# Patient Record
Sex: FEMALE | Race: WHITE | Employment: FULL TIME | URBAN - METROPOLITAN AREA
[De-identification: names, ages, dates, MRNs, and addresses within clinical notes are randomized per-mention and may not be internally consistent; named-entity substitution may affect disease eponyms.]

---

## 2017-02-03 DIAGNOSIS — G47.00 INSOMNIA, UNSPECIFIED TYPE: ICD-10-CM

## 2017-02-03 RX ORDER — ZOLPIDEM TARTRATE 5 MG/1
5 TABLET ORAL
Qty: 30 TAB | Refills: 0 | OUTPATIENT
Start: 2017-02-03 | End: 2017-03-22 | Stop reason: SDUPTHER

## 2017-02-03 NOTE — TELEPHONE ENCOUNTER
----- Message from Seven Roche sent at 2/3/2017  1:39 PM EST -----  Regarding: Dr. Jnenifer Mon  Pt  is requesting a  Rx refill on Zolt[facility] called to [facility] [phone]. Pharmacist has been trying to get this done for 2 days. Best contact number is [phone].

## 2017-02-17 ENCOUNTER — OFFICE VISIT (OUTPATIENT)
Dept: INTERNAL MEDICINE CLINIC | Age: 42
End: 2017-02-17

## 2017-02-17 VITALS
HEIGHT: 65 IN | HEART RATE: 80 BPM | TEMPERATURE: 98.2 F | OXYGEN SATURATION: 99 % | RESPIRATION RATE: 18 BRPM | SYSTOLIC BLOOD PRESSURE: 130 MMHG | DIASTOLIC BLOOD PRESSURE: 77 MMHG

## 2017-02-17 DIAGNOSIS — F34.1 DYSTHYMIA: Primary | ICD-10-CM

## 2017-02-17 RX ORDER — HYDROXYZINE 25 MG/1
TABLET, FILM COATED ORAL
COMMUNITY
Start: 2017-02-10 | End: 2017-02-17 | Stop reason: ALTCHOICE

## 2017-02-17 RX ORDER — TOBRAMYCIN AND DEXAMETHASONE 3; 1 MG/ML; MG/ML
SUSPENSION/ DROPS OPHTHALMIC
COMMUNITY
Start: 2017-01-31 | End: 2017-02-17 | Stop reason: ALTCHOICE

## 2017-02-17 RX ORDER — GUAIFENESIN 600 MG/1
600 TABLET, EXTENDED RELEASE ORAL 2 TIMES DAILY
COMMUNITY

## 2017-02-17 RX ORDER — ESCITALOPRAM OXALATE 20 MG/1
20 TABLET ORAL DAILY
COMMUNITY
Start: 2017-02-10 | End: 2017-02-17 | Stop reason: SDUPTHER

## 2017-02-17 RX ORDER — FOLIC ACID 1 MG/1
1 TABLET ORAL DAILY
COMMUNITY
Start: 2017-02-10

## 2017-02-17 RX ORDER — ESCITALOPRAM OXALATE 20 MG/1
20 TABLET ORAL DAILY
COMMUNITY

## 2017-02-17 RX ORDER — HYDROXYZINE 25 MG/1
TABLET, FILM COATED ORAL AS NEEDED
COMMUNITY
End: 2017-02-17

## 2017-02-17 RX ORDER — AZITHROMYCIN 250 MG/1
250 TABLET, FILM COATED ORAL DAILY
COMMUNITY
Start: 2017-02-15 | End: 2017-02-17

## 2017-02-17 RX ORDER — HYDROXYZINE PAMOATE 25 MG/1
25 CAPSULE ORAL
COMMUNITY

## 2017-02-17 NOTE — PROGRESS NOTES
HISTORY OF PRESENT ILLNESS  Bre Garcia is a 46 y.o. female. HPI  Problem follow up:  Bre Garcia returns for follow up visit regarding situational depression. she was seen 3 months ago in office by NP and psychiatry - Dr. Tu Breaux diagnosed with situational mood disorder and treated with increasing lexapro, vistaril, ambien. Workup was significant for same. Notes, labs, studies, imaging related to this problem during prior visit were not all available . Since that visit, she has significantly improved. she has been compliant with prescribed treatment. Residual symptoms include: none - she feels back to normal.  Good exercise  New issues associated with this problem include: none. ROS    Physical Exam   Psychiatric: She has a normal mood and affect. Her speech is normal and behavior is normal. Judgment and thought content normal. Cognition and memory are normal.     Visit Vitals    /77 (BP 1 Location: Left arm, BP Patient Position: Sitting)    Pulse 80    Temp 98.2 °F (36.8 °C) (Oral)    Resp 18    Ht 5' 5\" (1.651 m)    SpO2 99%       ASSESSMENT and Markhamtiffani Finley was seen today for anxiety and medication evaluation. Diagnoses and all orders for this visit:    Dysthymia - much improved. Follow up with psychiatry. She would like eventually to taper down medications over time under psychiatry's guidance. Over 50% of the 15 minutes face to face with Wendy Baca consisted of counseling and/or discussing treatment plans in reference to her mood disorder.               Follow-up Disposition: Not on File

## 2017-02-28 DIAGNOSIS — K21.9 GASTROESOPHAGEAL REFLUX DISEASE, ESOPHAGITIS PRESENCE NOT SPECIFIED: Primary | ICD-10-CM

## 2017-02-28 RX ORDER — ESOMEPRAZOLE MAGNESIUM 40 MG/1
40 CAPSULE, DELAYED RELEASE ORAL DAILY
Qty: 90 CAP | Refills: 1 | Status: SHIPPED | OUTPATIENT
Start: 2017-02-28

## 2017-03-22 DIAGNOSIS — G47.00 INSOMNIA, UNSPECIFIED TYPE: ICD-10-CM

## 2017-03-22 RX ORDER — ZOLPIDEM TARTRATE 5 MG/1
5 TABLET ORAL
Qty: 30 TAB | Refills: 0 | OUTPATIENT
Start: 2017-03-22 | End: 2017-04-24 | Stop reason: SDUPTHER

## 2017-06-27 ENCOUNTER — DOCUMENTATION ONLY (OUTPATIENT)
Dept: INTERNAL MEDICINE CLINIC | Age: 42
End: 2017-06-27

## 2017-12-05 DIAGNOSIS — G47.00 INSOMNIA, UNSPECIFIED TYPE: ICD-10-CM

## 2017-12-06 RX ORDER — ZOLPIDEM TARTRATE 5 MG/1
TABLET ORAL
Qty: 20 TAB | Refills: 0 | Status: SHIPPED | OUTPATIENT
Start: 2017-12-06 | End: 2018-01-09 | Stop reason: SDUPTHER

## 2017-12-08 NOTE — TELEPHONE ENCOUNTER
Medication phoned in as prescribed with note appointment required within 30 day before additional refills approved.

## 2023-01-18 ENCOUNTER — TELEPHONE (OUTPATIENT)
Dept: INTERNAL MEDICINE CLINIC | Age: 48
End: 2023-01-18

## 2023-01-18 NOTE — TELEPHONE ENCOUNTER
Patient called via the [address] [place] [address] and would like a call back from the . She would like to know if Dr. Chidi Victoria would accept her as a new patient. Patient is incredibly upset. She states she has called multiple times and gotten no response from anyone. Patient states she was never offered another provider when she left our practice almost 6 years ago. Patient was last seen in February of 2017 and said she only stopped coming because her PCP Dr. Mckay Sharma got sick and left the practice. Patient states she was not offered another provider at the time like she was told other patients were. Patient thinks this is unfair and she should be taken on as a new patient since she never was given a chance to establish with Dr. Chidi Victoria. PSR is routing this as high priority as the patient is incredibly upset. Please advise.